# Patient Record
Sex: MALE | Race: WHITE | NOT HISPANIC OR LATINO | Employment: FULL TIME | URBAN - NONMETROPOLITAN AREA
[De-identification: names, ages, dates, MRNs, and addresses within clinical notes are randomized per-mention and may not be internally consistent; named-entity substitution may affect disease eponyms.]

---

## 2019-10-14 ENCOUNTER — HOSPITAL ENCOUNTER (EMERGENCY)
Facility: HOSPITAL | Age: 44
Discharge: HOME | End: 2019-10-14
Attending: EMERGENCY MEDICINE | Admitting: EMERGENCY MEDICINE

## 2019-10-14 ENCOUNTER — APPOINTMENT (OUTPATIENT)
Dept: GENERAL RADIOLOGY | Facility: HOSPITAL | Age: 44
End: 2019-10-14

## 2019-10-14 VITALS
DIASTOLIC BLOOD PRESSURE: 90 MMHG | WEIGHT: 229 LBS | SYSTOLIC BLOOD PRESSURE: 146 MMHG | HEIGHT: 70 IN | BODY MASS INDEX: 32.78 KG/M2 | HEART RATE: 75 BPM | TEMPERATURE: 97.8 F | RESPIRATION RATE: 18 BRPM | OXYGEN SATURATION: 98 %

## 2019-10-14 DIAGNOSIS — S63.501A SPRAIN OF RIGHT WRIST, INITIAL ENCOUNTER: Primary | ICD-10-CM

## 2019-10-14 PROCEDURE — 73110 X-RAY EXAM OF WRIST: CPT

## 2019-10-14 PROCEDURE — 99283 EMERGENCY DEPT VISIT LOW MDM: CPT

## 2019-10-14 RX ORDER — DEXTROAMPHETAMINE SACCHARATE, AMPHETAMINE ASPARTATE, DEXTROAMPHETAMINE SULFATE AND AMPHETAMINE SULFATE 7.5; 7.5; 7.5; 7.5 MG/1; MG/1; MG/1; MG/1
30 TABLET ORAL DAILY
COMMUNITY

## 2019-10-14 RX ORDER — TOPIRAMATE 100 MG/1
100 TABLET, FILM COATED ORAL DAILY
COMMUNITY
Start: 2019-09-04 | End: 2020-03-03

## 2019-10-14 RX ORDER — ZOLPIDEM TARTRATE 10 MG/1
10 TABLET ORAL
COMMUNITY
Start: 2019-09-04 | End: 2019-12-04

## 2019-10-14 RX ORDER — DEXTROAMPHETAMINE SACCHARATE, AMPHETAMINE ASPARTATE, DEXTROAMPHETAMINE SULFATE AND AMPHETAMINE SULFATE 2.5; 2.5; 2.5; 2.5 MG/1; MG/1; MG/1; MG/1
TABLET ORAL
COMMUNITY
Start: 2019-03-14

## 2019-10-14 RX ORDER — IBUPROFEN 600 MG/1
600 TABLET ORAL EVERY 6 HOURS PRN
Qty: 30 TABLET | Refills: 0 | Status: SHIPPED | OUTPATIENT
Start: 2019-10-14

## 2019-10-14 RX ORDER — LOSARTAN POTASSIUM 25 MG/1
25 TABLET ORAL
COMMUNITY

## 2019-10-14 RX ORDER — ACETAMINOPHEN 500 MG
1000 TABLET ORAL ONCE
Status: COMPLETED | OUTPATIENT
Start: 2019-10-14 | End: 2019-10-14

## 2019-10-14 RX ADMIN — ACETAMINOPHEN 1000 MG: 500 TABLET ORAL at 23:31

## 2019-10-15 NOTE — DISCHARGE INSTRUCTIONS
Take Tylenol/ibuprofen as needed.  Follow-up with Ortho for reevaluation.  Return to ER for any worsening.

## 2019-10-15 NOTE — ED PROVIDER NOTES
Subjective   44 years old right-handed dominant male presented in the ER with chief complaint of right wrist pain after ground-level fall.  Patient tripped and fell, tried to hold himself down with outstretched right hand.  Did not hit his head, no loss of consciousness.  Since then he is having moderate intensity sharp pain, aggravated with movements at rest and better with being still.  No swelling around wrist.  Initially had some tingling sensation in the first 4 digits which is getting better now.  No pain with movements of the elbow.  No injury anywhere else.        History provided by:  Patient      Review of Systems   Constitutional: Negative for chills and fever.   HENT: Negative for congestion, postnasal drip, sinus pressure, sore throat and trouble swallowing.    Respiratory: Negative for chest tightness and shortness of breath.    Cardiovascular: Negative for chest pain.   Gastrointestinal: Negative for abdominal pain, nausea and vomiting.   Genitourinary: Negative for flank pain.   Musculoskeletal: Negative for myalgias.   Skin: Negative for color change.   Neurological: Negative for syncope and headaches.   Psychiatric/Behavioral: Negative for agitation.       Past Medical History:   Diagnosis Date   • Hypertension    • Prostatitis        Allergies   Allergen Reactions   • Paxil [Paroxetine Hcl] Mental Status Change       Past Surgical History:   Procedure Laterality Date   • EXPLORATORY LAPAROTOMY     • HERNIA REPAIR     • KNEE SURGERY Left        History reviewed. No pertinent family history.    Social History     Socioeconomic History   • Marital status:      Spouse name: Not on file   • Number of children: Not on file   • Years of education: Not on file   • Highest education level: Not on file   Tobacco Use   • Smoking status: Current Every Day Smoker     Packs/day: 0.50     Years: 30.00     Pack years: 15.00     Types: Cigarettes   Substance and Sexual Activity   • Alcohol use: Yes      Comment: Social           Objective   Physical Exam   Constitutional: He is oriented to person, place, and time. He appears well-developed and well-nourished.   HENT:   Head: Normocephalic and atraumatic.   Nose: Nose normal.   Mouth/Throat: Oropharynx is clear and moist.   Eyes: Conjunctivae are normal.   Neck: Normal range of motion. Neck supple.   Cardiovascular: Normal rate, regular rhythm and normal heart sounds.   Pulmonary/Chest: Effort normal and breath sounds normal.   Musculoskeletal: He exhibits tenderness.        Right wrist: He exhibits decreased range of motion, tenderness and bony tenderness. He exhibits no swelling, no effusion, no crepitus and no deformity.   Neurological: He is oriented to person, place, and time.   Skin: Skin is warm. Capillary refill takes less than 2 seconds.   Nursing note and vitals reviewed.      Procedures           ED Course                  MDM  Number of Diagnoses or Management Options  Sprain of right wrist, initial encounter:   Diagnosis management comments: Ruled out fracture dislocation.  He is given Tylenol as patient does not want anything else for pain.  I would place him in a ready made  after wrist splint and have him follow-up with Ortho for reevaluation.       Amount and/or Complexity of Data Reviewed  Tests in the radiology section of CPT®: ordered and reviewed    Labs Reviewed - No data to display    Xr Wrist 3+ View Right    Result Date: 10/14/2019  Narrative: EXAM DESCRIPTION:  XR WRIST 3+ VW RIGHT CLINICAL HISTORY: 44 years  Male  fall TECHNIQUE: Four views of the right wrist are provided. COMPARISON: No prior exams provided for comparison. FINDINGS: There is no acute right wrist fracture or dislocation. Carpal alignment is maintained. Visualized joint spaces are normal. No aggressive osseous lesions.     Impression: Normal radiographs of the right wrist. Electronically signed by:  Jena Pryor MD  10/14/2019 10:53 PM CDT Workstation:  [id number]          Final diagnoses:   Sprain of right wrist, initial encounter              Yaniv Ojeda MD  10/14/19 0755

## 2024-12-03 NOTE — ED NOTES
Pt hearing a pop in right wrist when he fell in his outstretched arm and palm. Pt states right hand and wrist are swollen; reports middle three finger tips have a tingling sensation; reports  strength has improved since initial injury. Pt states he has been taking three doses of 800mg ibuprofen in 24 hrs.     Sonny Smith, RN  10/14/19 [id number]     No